# Patient Record
Sex: FEMALE | Race: WHITE | NOT HISPANIC OR LATINO | Employment: OTHER | URBAN - METROPOLITAN AREA
[De-identification: names, ages, dates, MRNs, and addresses within clinical notes are randomized per-mention and may not be internally consistent; named-entity substitution may affect disease eponyms.]

---

## 2017-04-19 ENCOUNTER — HOSPITAL ENCOUNTER (OUTPATIENT)
Dept: MAMMOGRAPHY | Facility: HOSPITAL | Age: 62
Discharge: HOME | End: 2017-04-19
Attending: OBSTETRICS & GYNECOLOGY | Admitting: OBSTETRICS & GYNECOLOGY

## 2017-04-19 DIAGNOSIS — R92.8 ABNORMAL MAMMOGRAM: ICD-10-CM

## 2017-04-19 PROCEDURE — 77065 DX MAMMO INCL CAD UNI: CPT | Performed by: RADIOLOGY

## 2017-04-19 PROCEDURE — G0206 DX MAMMO INCL CAD UNI: HCPCS

## 2017-09-25 ENCOUNTER — TRANSCRIBE ORDERS (OUTPATIENT)
Dept: GYNECOLOGIC ONCOLOGY | Facility: CLINIC | Age: 62
End: 2017-09-25

## 2017-09-25 DIAGNOSIS — Z12.31 VISIT FOR SCREENING MAMMOGRAM: Primary | ICD-10-CM

## 2017-10-12 ENCOUNTER — APPOINTMENT (OUTPATIENT)
Dept: MAMMOGRAPHY | Facility: HOSPITAL | Age: 62
End: 2017-10-12

## 2017-10-12 ENCOUNTER — TRANSCRIBE ORDERS (OUTPATIENT)
Dept: GYNECOLOGIC ONCOLOGY | Facility: CLINIC | Age: 62
End: 2017-10-12

## 2017-10-12 DIAGNOSIS — R92.8 ABNORMAL MAMMOGRAPHY: Primary | ICD-10-CM

## 2017-10-16 ENCOUNTER — HOSPITAL ENCOUNTER (OUTPATIENT)
Dept: MAMMOGRAPHY | Facility: HOSPITAL | Age: 62
Discharge: HOME | End: 2017-10-16
Admitting: NURSE PRACTITIONER

## 2017-10-16 ENCOUNTER — HOSPITAL ENCOUNTER (OUTPATIENT)
Dept: ULTRASOUND IMAGING | Facility: HOSPITAL | Age: 62
Discharge: HOME | End: 2017-10-16

## 2017-10-16 DIAGNOSIS — R92.8 ABNORMAL MAMMOGRAPHY: ICD-10-CM

## 2017-10-16 PROCEDURE — 76642 ULTRASOUND BREAST LIMITED: CPT

## 2017-10-16 PROCEDURE — 76642 ULTRASOUND BREAST LIMITED: CPT | Performed by: RADIOLOGY

## 2017-10-16 PROCEDURE — 77062 BREAST TOMOSYNTHESIS BI: CPT | Performed by: RADIOLOGY

## 2017-10-16 PROCEDURE — G0279 TOMOSYNTHESIS, MAMMO: HCPCS

## 2017-10-16 PROCEDURE — 77066 DX MAMMO INCL CAD BI: CPT | Performed by: RADIOLOGY

## 2017-10-16 PROCEDURE — G0204 DX MAMMO INCL CAD BI: HCPCS

## 2017-10-25 ENCOUNTER — OFFICE VISIT (OUTPATIENT)
Dept: OBSTETRICS AND GYNECOLOGY | Facility: CLINIC | Age: 62
End: 2017-10-25

## 2017-10-25 VITALS
OXYGEN SATURATION: 97 % | HEIGHT: 62 IN | WEIGHT: 141.4 LBS | HEART RATE: 59 BPM | BODY MASS INDEX: 26.02 KG/M2 | DIASTOLIC BLOOD PRESSURE: 70 MMHG | SYSTOLIC BLOOD PRESSURE: 116 MMHG | RESPIRATION RATE: 14 BRPM

## 2017-10-25 DIAGNOSIS — Z01.419 WELL WOMAN EXAM WITH ROUTINE GYNECOLOGICAL EXAM: Primary | ICD-10-CM

## 2017-10-25 PROCEDURE — 99396 PREV VISIT EST AGE 40-64: CPT | Performed by: NURSE PRACTITIONER

## 2017-10-25 NOTE — PROGRESS NOTES
[facility] ANNUAL WELL WOMAN VISIT      Ziyad Naranjo  [id number]  1955      Chief Complaint: Gynecologic Exam (No complaints)        History of present illness:    Ziyad Naranjo is a 62 y.o. year old  menopausal female presenting to be seen for her annual exam. She is a previous patient of Dr. Kennedy, last seen on 10/25/2016 for annual exam. This past year she has not been on hormone replacement therapy.  There has not been vaginal bleeding in the last 12 months.  Menopausal symptoms are not present.    SEXUAL Hx:  She is not currently sexually active.  In the past year there has not been new sexual partners.    Condoms are not typically used.  She would not like to be screened for STD's at today's exam.  HEALTH Hx:  She exercises regularly: yes.  She wears her seat belt:yes.  She has concerns about domestic violence: no.  She has noticed changes in height: no.   She is a non-smoker.    Past Medical History:   Diagnosis Date   • Arthritis        Past Surgical History:   Procedure Laterality Date   • LAPAROSCOPIC TUBAL LIGATION     • REPLACEMENT TOTAL KNEE Left    • REPLACEMENT TOTAL KNEE Right     5 months after the left knee   • TOTAL HIP ARTHROPLASTY Right    • TOTAL HIP ARTHROPLASTY Left 2017   • TOTAL SHOULDER ARTHROPLASTY Left        MEDICATIONS: The current medication list was reviewed and reconciled.     Allergies:  has No Known Allergies.    Family History   Problem Relation Age of Onset   • Osteoporosis Mother    • Hypertension Father    • Breast cancer Neg Hx    • Ovarian cancer Neg Hx    • Endometrial cancer Neg Hx        Health Maintenance:  Last mammogram was 10/16/2017. Last colonoscopy was , with recommended follow-up in 10 year(s). Last DEXA was . Last pap smear was 10/25/2016, results were  normal PAP.. She  does not have a history of abnormal pap smears.    Review of Systems   Constitutional: Negative for fatigue, fever and unexpected weight change.  "  HENT: Negative for congestion, ear pain, hearing loss, sinus pressure and trouble swallowing.    Eyes: Negative for visual disturbance.   Respiratory: Negative for cough, chest tightness, shortness of breath and wheezing.    Cardiovascular: Negative for chest pain, palpitations and leg swelling.   Gastrointestinal: Negative for abdominal distention, abdominal pain, constipation, diarrhea, nausea and vomiting.   Endocrine: Negative for cold intolerance, heat intolerance, polydipsia, polyphagia and polyuria.   Genitourinary: Negative for difficulty urinating, dyspareunia, dysuria, frequency, hematuria, pelvic pain, urgency, vaginal bleeding, vaginal discharge and vaginal pain.   Musculoskeletal: Negative for arthralgias, gait problem, joint swelling and myalgias.   Skin: Negative for color change, pallor and rash.   Neurological: Negative for dizziness, seizures, syncope, weakness, light-headedness, numbness and headaches.   Hematological: Negative for adenopathy. Does not bruise/bleed easily.   Psychiatric/Behavioral: Negative for agitation, confusion, sleep disturbance and suicidal ideas. The patient is not nervous/anxious.        Physical Exam  Vital Signs: /70  Pulse 59  Resp 14  Ht 62\" (157.5 cm)  Wt 141 lb 6.4 oz (64.1 kg)  LMP  (LMP Unknown)  SpO2 97%  Breastfeeding? No  BMI 25.86 kg/m2   General Appearance:  alert, cooperative, no apparent distress and appears stated age   Neurologic/Psychiatric: A&O x 3, gait steady, appropriate affect   HEENT:  Normocephalic, without obvious abnormality, mucous membranes moist   Neck: Supple, symmetrical, trachea midline, no adenopathy;  No thyromegaly, masses, or tenderness   Back:   Symmetric, no curvature, ROM normal, no CVA tenderness   Lungs:   Clear to auscultation bilaterally; respirations regular, even, and unlabored bilaterally   Heart:  Regular rate and rhythm, no murmurs appreciated   Breasts:  Symmetrical, no masses, no lesions and no nipple " discharge   Abdomen:   Soft, non-tender, non-distended and no organomegaly   Lymph nodes: No cervical, supraclavicular, inguinal or axillary adenopathy noted   Extremities: Normal, atraumatic; no clubbing, cyanosis, or edema    Skin: No rashes, ulcers, or suspicious lesions noted   Pelvic: External Genitalia  without lesions or skin changes  Vagina  is pink, moist, without lesions.   Cervix  normal, without lesions, no discharge, no CMT and pap obtained  Uterus  normal size, midposition, mobile and nontender  Ovaries  without palpable masses or fullness  Parametria  smooth  Rectovaginal  Female rectovaginal: deferred       Procedure Note:  No notes on file    Assessment and Plan:    Ziyad was seen today for gynecologic exam.    Diagnoses and all orders for this visit:    Well woman exam with routine gynecological exam  -     Pap IG, HPV-hr - ThinPrep Vial, Cervix; Future        Pap was done today per patient request even though it has been less then 3 years since her last Pap smear.  If she does not receive the results of the Pap within 2 weeks  time, she was instructed to call to find out the results.  I explained to Ziyad that the recommendations for Pap smear interval in a low risk patient's has lengthened to 3 years time.  I encouraged her to be seen yearly for a full physical exam including breast and pelvic exam even during the off years when PAP's will not be performed.    She was encouraged to get yearly mammograms.  She should report any palpable breast lump(s) or skin changes regardless of mammographic findings.  I explained to Ziyad that notification regarding her mammogram results will come from the center performing the study.  Our office will not be routinely calling with mammogram results.  It is her responsibility to make sure that the results from the mammogram are communicated to her by the breast center.  If she has any questions about the results, she is welcome to call our office  anytime.    Repeat colonoscopy in 2024 or sooner if problems. Repeat DEXA at age 67 unless problems.     Return in about 1 year (around 10/25/2018) for annual exam or PRN.      KEHINDE Stone      Note: Speech recognition transcription software was used to dictate portions of this document.  An attempt at proofreading has been made though minor errors in transcription may still be present.  Please do not hesitate to call our office with any questions.

## 2017-11-21 ENCOUNTER — APPOINTMENT (OUTPATIENT)
Dept: MAMMOGRAPHY | Facility: HOSPITAL | Age: 62
End: 2017-11-21

## 2018-09-12 ENCOUNTER — TRANSCRIBE ORDERS (OUTPATIENT)
Dept: GYNECOLOGIC ONCOLOGY | Facility: CLINIC | Age: 63
End: 2018-09-12

## 2018-09-12 DIAGNOSIS — Z12.31 VISIT FOR SCREENING MAMMOGRAM: Primary | ICD-10-CM

## 2018-10-19 ENCOUNTER — HOSPITAL ENCOUNTER (OUTPATIENT)
Dept: MAMMOGRAPHY | Facility: HOSPITAL | Age: 63
Discharge: HOME | End: 2018-10-19
Admitting: NURSE PRACTITIONER

## 2018-10-19 DIAGNOSIS — Z12.31 VISIT FOR SCREENING MAMMOGRAM: ICD-10-CM

## 2018-10-19 PROCEDURE — 77063 BREAST TOMOSYNTHESIS BI: CPT

## 2018-10-19 PROCEDURE — 77063 BREAST TOMOSYNTHESIS BI: CPT | Performed by: RADIOLOGY

## 2018-10-19 PROCEDURE — 77067 SCR MAMMO BI INCL CAD: CPT

## 2018-10-19 PROCEDURE — 77067 SCR MAMMO BI INCL CAD: CPT | Performed by: RADIOLOGY

## 2018-10-29 PROBLEM — Z01.419 WELL WOMAN EXAM: Status: ACTIVE | Noted: 2018-10-29

## 2018-10-30 ENCOUNTER — OFFICE VISIT (OUTPATIENT)
Dept: OBSTETRICS AND GYNECOLOGY | Facility: CLINIC | Age: 63
End: 2018-10-30

## 2018-10-30 VITALS
DIASTOLIC BLOOD PRESSURE: 82 MMHG | HEIGHT: 62 IN | BODY MASS INDEX: 26.79 KG/M2 | WEIGHT: 145.6 LBS | SYSTOLIC BLOOD PRESSURE: 128 MMHG

## 2018-10-30 DIAGNOSIS — Z01.419 WELL WOMAN EXAM: Primary | ICD-10-CM

## 2018-10-30 DIAGNOSIS — N95.1 SYMPTOMATIC MENOPAUSAL OR FEMALE CLIMACTERIC STATES: ICD-10-CM

## 2018-10-30 PROCEDURE — 99396 PREV VISIT EST AGE 40-64: CPT | Performed by: OBSTETRICS & GYNECOLOGY

## 2018-10-30 NOTE — PROGRESS NOTES
"Subjective   Chief Complaint   Patient presents with   • Gynecologic Exam     pt here for annual, last pap 10/25/17 negative, mammo 10/19/18 negative. pt states no c/o     Ziyad Naranjo is a 63 y.o. year old  menopausal female presenting to be seen for her annual exam.  This past year she has not been on hormone replacement therapy.  There has not been vaginal bleeding in the last 12 months.  Menopausal symptoms are present (vaginal dryness) but not affecting her quality of life .    SEXUAL Hx:  She is currently sexually active.  In the past year there she has not been sexually active.    Condoms are not needed because she is not sexually active.  She would not like to be screened for STD's at today's exam.  [place] is painful: not asked  HEALTH Hx:  She exercises regularly: yes.  She wears her seat belt: yes.  She has concerns about domestic violence: no.  She has noticed changes in height: no.  OTHER THINGS SHE WANTS TO DISCUSS TODAY:  Nothing else    The following portions of the patient's history were reviewed and updated as appropriate:problem list, current medications, allergies, past family history, past medical history, past social history and past surgical history.    Smoking status: Current Every Day Smoker                                                   Packs/day: 1.00      Years: 0.00      Smokeless tobacco: Never Used                          Review of Systems  Constitutional POS: nothing reported    NEG: anorexia or night sweats   Genitourinary POS: nothing reported    NEG: dysuria or hematuria      Gastointestinal POS: nothing reported    NEG: bloating, change in bowel habits, melena or reflux symptoms   Integument POS: nothing reported    NEG: moles that are changing in size, shape, color or rashes   Breast POS: nothing reported    NEG: persistent breast lump, skin dimpling or nipple discharge        Objective   /82   Ht 157.5 cm (62\")   Wt 66 kg (145 lb 9.6 oz)   LMP  (LMP " Unknown)   BMI 26.63 kg/m²     General:  well developed; well nourished  no acute distress   Skin:  No suspicious lesions seen   Thyroid: normal to inspection and palpation   Breasts:  Examined in supine position  Symmetric without masses or skin dimpling  Nipples normal without inversion, lesions or discharge  There are no palpable axillary nodes   Abdomen: soft, non-tender; no masses  no umbilical or inginual hernias are present  no hepato-splenomegaly   Pelvis: Clinical staff was present for exam  External genitalia:  normal appearance of the external genitalia including Bartholin's and [place]'s glands.  :  urethral meatus normal;  Vaginal:  atrophic mucosal changes are present;  Cervix:  normal appearance. atrophic  Uterus:  normal size, shape and consistency.  Adnexa:  normal bimanual exam of the adnexa.  Rectal:  digital rectal exam not performed; anus visually normal appearing.        Assessment   1. Normal GYN exam in menopause  2. She is up to date on all relevant gynecologic and colorectal screenings except colonoscopy     Plan   1. Pap was not done today.  I explained to Ziyad that the recommendations for Pap smear interval in a low risk patient has lengthened to 3 years time.  I told Ziyad she still needs to be seen in our office yearly for a full physical including breast and pelvic exam.  2. She was encouraged to get yearly mammograms.  She should report any palpable breast lump(s) or skin changes regardless of mammographic findings.  I explained to Ziyad that notification regarding her mammogram results will come from the center performing the study.  Our office will not be routinely calling with mammogram results.  It is her responsibility to make sure that the results from the mammogram are communicated to her by the breast center.  If she has any questions about the results, she is welcome to call our office anytime.  3. DEXA 2016 T score -1.6 with normal FRAX. Recommend repeating  this year or next year given post menopausal and smoker   4. Colonoscopy was recommended for screening for colon cancer.  The procedure was briefly discussed and its benefits for early detection of colon cancer were emphasized.  I explained to Ziyad that we could help her to schedule it if she wishes.  Additionally, she could also contact her primary care physician to help make this arrangement.  After considering these options she will contact her PCP to arrange colonoscopy. The following data needs to be obtained to update her medical records: last colonoscopy.  5. The importance of keeping all planned follow-up and taking all medications as prescribed was emphasized.  6. Follow up for annual exam in 1 year     No orders of the defined types were placed in this encounter.         This note was electronically signed.    Tiff Mata MD  October 30, 2018    Note: Speech recognition transcription software may have been used to create portions of this document.  An attempt at proofreading has been made but errors in transcription could still be present.

## 2019-09-11 ENCOUNTER — TRANSCRIBE ORDERS (OUTPATIENT)
Dept: ADMINISTRATIVE | Facility: HOSPITAL | Age: 64
End: 2019-09-11

## 2019-09-11 DIAGNOSIS — Z12.31 VISIT FOR SCREENING MAMMOGRAM: Primary | ICD-10-CM

## 2019-11-06 ENCOUNTER — OFFICE VISIT (OUTPATIENT)
Dept: OBSTETRICS AND GYNECOLOGY | Facility: CLINIC | Age: 64
End: 2019-11-06

## 2019-11-06 VITALS
DIASTOLIC BLOOD PRESSURE: 74 MMHG | HEIGHT: 63 IN | SYSTOLIC BLOOD PRESSURE: 120 MMHG | BODY MASS INDEX: 26.22 KG/M2 | WEIGHT: 148 LBS

## 2019-11-06 DIAGNOSIS — Z12.11 COLON CANCER SCREENING: ICD-10-CM

## 2019-11-06 DIAGNOSIS — Z01.419 WELL WOMAN EXAM: Primary | ICD-10-CM

## 2019-11-06 PROCEDURE — 99396 PREV VISIT EST AGE 40-64: CPT | Performed by: OBSTETRICS & GYNECOLOGY

## 2019-11-06 NOTE — PROGRESS NOTES
"Subjective   Chief Complaint   Patient presents with   • Gynecologic Exam     pt here for annual. last pap 10/25/17 negative, last mammo 10/19/18 negative and has one scheduled for 19. pt has a place that she is concerned with on her arm.     Ziyad Naranjo is a 64 y.o. year old  menopausal female presenting to be seen for her annual exam.  This past year she has not been on hormone replacement therapy.  There has not been vaginal bleeding in the last 12 months.  Menopausal symptoms are not present.    SEXUAL Hx:  She is not currently sexually active -single.  In the past year there she has not been sexually active.    Condoms are not needed because she is not sexually active.  She would not like to be screened for STD's at today's exam.  [place] is painful: not asked  HEALTH Hx:  She exercises regularly: yes, gardens and mowing   She wears her seat belt: yes.  She has concerns about domestic violence: no.  OTHER THINGS SHE WANTS TO DISCUSS TODAY:  Nothing else    The following portions of the patient's history were reviewed and updated as appropriate:problem list, current medications, allergies, past family history, past medical history, past social history and past surgical history.    Social History    Tobacco Use      Smoking status: Current Every Day Smoker        Packs/day: 1.00      Smokeless tobacco: Never Used      Review of Systems  Constitutional POS: nothing reported    NEG: anorexia or night sweats   Genitourinary POS: nothing reported    NEG: dysuria or hematuria      Gastointestinal POS: nothing reported    NEG: bloating, change in bowel habits, melena or reflux symptoms   Integument POS: nothing reported    NEG: moles that are changing in size, shape, color or rashes   Breast POS: nothing reported    NEG: persistent breast lump, skin dimpling or nipple discharge        Objective   /74   Ht 160 cm (63\")   Wt 67.1 kg (148 lb)   LMP  (LMP Unknown)   Breastfeeding? No   BMI " 26.22 kg/m²     General:  well developed; well nourished  no acute distress   Skin:  No suspicious lesions seen   Thyroid: normal to inspection and palpation   Breasts:  Examined in supine position  Symmetric without masses or skin dimpling  Nipples normal without inversion, lesions or discharge  There are no palpable axillary nodes   Abdomen: soft, non-tender; no masses  no umbilical or inguinal hernias are present  no hepato-splenomegaly   Pelvis: Clinical staff was present for exam  External genitalia:  normal appearance of the external genitalia including Bartholin's and [place]'s glands.  :  urethral meatus normal;  Vaginal:  normal pink mucosa without prolapse or lesions.  Cervix:  normal appearance.  Uterus:  normal size, shape and consistency.  Adnexa:  non palpable bilaterally.  Rectal:  anus visually normal appearing. recto-vaginal exam unremarkable and confirms findings;        Assessment   1. Normal GYN exam in menopause  2. She is up to date on all relevant gynecologic and colorectal screenings except DEXA's for osteoporosis     Plan   1. Pap was not done today.  I explained to Ziyad that the Pap smears are no longer recommended in patient's after 65 years of age.   I stressed to Ziyad that she still should be seen to be seen yearly for a full physical including breast and pelvic exam.-  2. Colonoscopy was recommended for screening for colon cancer.  The procedure was briefly discussed and its benefits for early detection of colon cancer were emphasized.  I explained to Ziyad that we could help her to schedule it if she wishes.  Additionally, she could also contact her primary care physician to help make this arrangement.  After considering these options she wants help setting up her colonoscopy.  Referral will be made to Dr. Sol for outpatient colonoscopy.  3. She was encouraged to get yearly mammograms.  She should report any palpable breast lump(s) or skin changes regardless of  mammographic findings.  I explained to Ziyad that notification regarding her mammogram results will come from the center performing the study.  Our office will not be routinely calling with mammogram results.  It is her responsibility to make sure that the results from the mammogram are communicated to her by the breast center.  If she has any questions about the results, she is welcome to call our office anytime.  4. Encouraged her to get DEXA that was ordered last year.   5. The importance of keeping all planned follow-up and taking all medications as prescribed was emphasized.  6. Follow up for annual exam in one year    No orders of the defined types were placed in this encounter.         This note was electronically signed.    Tiff Mata MD  November 6, 2019    Note: Speech recognition transcription software may have been used to create portions of this document.  An attempt at proofreading has been made but errors in transcription could still be present.

## 2019-11-12 ENCOUNTER — APPOINTMENT (OUTPATIENT)
Dept: MAMMOGRAPHY | Facility: HOSPITAL | Age: 64
End: 2019-11-12

## 2019-11-12 ENCOUNTER — HOSPITAL ENCOUNTER (OUTPATIENT)
Dept: MAMMOGRAPHY | Facility: HOSPITAL | Age: 64
Discharge: HOME | End: 2019-11-12
Admitting: OBSTETRICS & GYNECOLOGY

## 2019-11-12 DIAGNOSIS — Z12.31 VISIT FOR SCREENING MAMMOGRAM: ICD-10-CM

## 2019-11-12 PROCEDURE — 77067 SCR MAMMO BI INCL CAD: CPT | Performed by: RADIOLOGY

## 2019-11-12 PROCEDURE — 77063 BREAST TOMOSYNTHESIS BI: CPT

## 2019-11-12 PROCEDURE — 77063 BREAST TOMOSYNTHESIS BI: CPT | Performed by: RADIOLOGY

## 2019-11-12 PROCEDURE — 77067 SCR MAMMO BI INCL CAD: CPT

## 2020-10-20 ENCOUNTER — TRANSCRIBE ORDERS (OUTPATIENT)
Dept: ADMINISTRATIVE | Facility: HOSPITAL | Age: 65
End: 2020-10-20

## 2020-10-20 DIAGNOSIS — Z12.31 VISIT FOR SCREENING MAMMOGRAM: Primary | ICD-10-CM

## 2020-11-18 ENCOUNTER — OFFICE VISIT (OUTPATIENT)
Dept: OBSTETRICS AND GYNECOLOGY | Facility: CLINIC | Age: 65
End: 2020-11-18

## 2020-11-18 ENCOUNTER — LAB (OUTPATIENT)
Dept: LAB | Facility: HOSPITAL | Age: 65
End: 2020-11-18

## 2020-11-18 VITALS
DIASTOLIC BLOOD PRESSURE: 80 MMHG | HEIGHT: 63 IN | BODY MASS INDEX: 27.14 KG/M2 | WEIGHT: 153.2 LBS | SYSTOLIC BLOOD PRESSURE: 128 MMHG

## 2020-11-18 DIAGNOSIS — Z01.419 WELL WOMAN EXAM WITH ROUTINE GYNECOLOGICAL EXAM: Primary | ICD-10-CM

## 2020-11-18 DIAGNOSIS — N95.1 SYMPTOMATIC MENOPAUSAL OR FEMALE CLIMACTERIC STATES: ICD-10-CM

## 2020-11-18 DIAGNOSIS — Z86.39 HISTORY OF VITAMIN D DEFICIENCY: ICD-10-CM

## 2020-11-18 LAB — 25(OH)D3 SERPL-MCNC: 30.5 NG/ML (ref 30–100)

## 2020-11-18 PROCEDURE — 82306 VITAMIN D 25 HYDROXY: CPT

## 2020-11-18 PROCEDURE — 36415 COLL VENOUS BLD VENIPUNCTURE: CPT

## 2020-11-18 PROCEDURE — 99396 PREV VISIT EST AGE 40-64: CPT | Performed by: OBSTETRICS & GYNECOLOGY

## 2020-11-18 NOTE — PROGRESS NOTES
"Subjective   Chief Complaint   Patient presents with   • Gynecologic Exam     annual. last pap 10/25/17 negative, last mammo 19 negative. mammo scheduled for 21.     Ziyad Naranjo is a 65 y.o. year old  menopausal female presenting to be seen for her annual exam.  This past year she has not been on hormone replacement therapy.  There has not been vaginal bleeding in the last 12 months.  Menopausal symptoms are not present.    SEXUAL Hx:  She is not currently sexually active.  In the past year there she has not been sexually active.    Condoms are not needed because she is not sexually active.  She would not like to be screened for STD's at today's exam.  [place] is painful: n/a  HEALTH Hx:  She exercises regularly: yes.  She wears her seat belt: yes.  She has concerns about domestic violence: no.  OTHER THINGS SHE WANTS TO DISCUSS TODAY:  Nothing else    The following portions of the patient's history were reviewed and updated as appropriate:problem list, current medications, allergies, past family history, past medical history, past social history and past surgical history.    Social History    Tobacco Use      Smoking status: Current Every Day Smoker        Packs/day: 1.00      Smokeless tobacco: Never Used      Review of Systems  Constitutional POS: nothing reported    NEG: anorexia or night sweats   Genitourinary POS: nothing reported    NEG: dysuria or hematuria      Gastointestinal POS: nothing reported    NEG: bloating, change in bowel habits, melena or reflux symptoms   Integument POS: nothing reported    NEG: moles that are changing in size, shape, color or rashes   Breast POS: nothing reported    NEG: persistent breast lump, skin dimpling or nipple discharge        Objective   /80   Ht 160 cm (63\")   Wt 69.5 kg (153 lb 3.2 oz)   LMP  (LMP Unknown)   Breastfeeding No   BMI 27.14 kg/m²     General:  well developed; well nourished  no acute distress   Skin:  No suspicious " lesions seen   Thyroid: normal to inspection and palpation   Breasts:  Examined in supine position  Symmetric without masses or skin dimpling  Nipples normal without inversion, lesions or discharge  There are no palpable axillary nodes   Abdomen: soft, non-tender; no masses  no umbilical or inguinal hernias are present  no hepato-splenomegaly   Pelvis: Clinical staff was present for exam  External genitalia:  normal appearance of the external genitalia including Bartholin's and [place]'s glands.  :  urethral meatus normal;  Vaginal:  normal pink mucosa without prolapse or lesions.  Cervix:  normal appearance.  Uterus:  normal size, shape and consistency.  Adnexa:  normal bimanual exam of the adnexa.  Rectal:  digital rectal exam not performed; anus visually normal appearing.        Assessment   1. Normal GYN exam in menopause  2. She is up to date on all relevant gynecologic and colorectal screenings except colon cancer screening and DEXA's for osteoporosis     Plan   Pap was not done today.  I explained to Ziyad that the recommendations for Pap smear interval in a low risk patient has lengthened to 5 years time if history of normal pap co-test.  I told Ziyad she still needs to be seen in our office yearly for a full physical including breast and pelvic exam.  She was encouraged to get yearly mammograms.  She should report any palpable breast lump(s) or skin changes regardless of mammographic findings.  I explained to Ziyad that notification regarding her mammogram results will come from the center performing the study.  Our office will not be routinely calling with mammogram results.  It is her responsibility to make sure that the results from the mammogram are communicated to her by the breast center.  If she has any questions about the results, she is welcome to call our office anytime.  She will call regarding colonoscopy   DEXA ordered   The following tests were ordered today: vitamin D 25-OH.  It  was explained to Ziyad that all lab test should be back within the one week after they are performed. She will be notified about the results, regardless of the findings. If she has not been contacted by the office within 2 weeks after the test has been performed, it is her responsibility to contact us to learn about her results.  The importance of keeping all planned follow-up and taking all medications as prescribed was emphasized.  Follow up for annual exam in one year    No orders of the defined types were placed in this encounter.         This note was electronically signed.    Tiff Mata MD  November 18, 2020    Note: Speech recognition transcription software may have been used to create portions of this document.  An attempt at proofreading has been made but errors in transcription could still be present.

## 2020-11-19 ENCOUNTER — TELEPHONE (OUTPATIENT)
Dept: OBSTETRICS AND GYNECOLOGY | Facility: CLINIC | Age: 65
End: 2020-11-19

## 2020-11-19 NOTE — TELEPHONE ENCOUNTER
BRYCE    Patient called and was returning a missed call.  Please call back and advise.  Thank you.

## 2021-01-21 ENCOUNTER — HOSPITAL ENCOUNTER (OUTPATIENT)
Dept: MAMMOGRAPHY | Facility: HOSPITAL | Age: 66
Discharge: HOME | End: 2021-01-21
Admitting: OBSTETRICS & GYNECOLOGY

## 2021-01-21 DIAGNOSIS — Z12.31 VISIT FOR SCREENING MAMMOGRAM: ICD-10-CM

## 2021-01-21 PROCEDURE — 77067 SCR MAMMO BI INCL CAD: CPT

## 2021-01-21 PROCEDURE — 77067 SCR MAMMO BI INCL CAD: CPT | Performed by: RADIOLOGY

## 2021-01-21 PROCEDURE — 77063 BREAST TOMOSYNTHESIS BI: CPT | Performed by: RADIOLOGY

## 2021-01-21 PROCEDURE — 77063 BREAST TOMOSYNTHESIS BI: CPT

## 2021-12-20 ENCOUNTER — TRANSCRIBE ORDERS (OUTPATIENT)
Dept: ADMINISTRATIVE | Facility: HOSPITAL | Age: 66
End: 2021-12-20

## 2021-12-20 ENCOUNTER — OFFICE VISIT (OUTPATIENT)
Dept: OBSTETRICS AND GYNECOLOGY | Facility: CLINIC | Age: 66
End: 2021-12-20

## 2021-12-20 VITALS
WEIGHT: 150.2 LBS | DIASTOLIC BLOOD PRESSURE: 76 MMHG | BODY MASS INDEX: 26.61 KG/M2 | SYSTOLIC BLOOD PRESSURE: 128 MMHG | HEIGHT: 63 IN

## 2021-12-20 DIAGNOSIS — Z12.31 VISIT FOR SCREENING MAMMOGRAM: Primary | ICD-10-CM

## 2021-12-20 DIAGNOSIS — Z01.411 ENCOUNTER FOR GYNECOLOGICAL EXAMINATION WITH ABNORMAL FINDING: Primary | ICD-10-CM

## 2021-12-20 DIAGNOSIS — F17.210 CIGARETTE SMOKER: ICD-10-CM

## 2021-12-20 DIAGNOSIS — Z13.820 SCREENING FOR OSTEOPOROSIS: ICD-10-CM

## 2021-12-20 PROCEDURE — 99396 PREV VISIT EST AGE 40-64: CPT | Performed by: NURSE PRACTITIONER

## 2021-12-20 NOTE — PROGRESS NOTES
"Chief Complaint  Ziyad Naranjo is a 66 y.o.  female presenting for Annual Exam    History of Present Illness  Very pleasant 67yo woman, pt of Dr. Mata's, here for annual gyn exam.  She is doing well in the past yr since appt with Dr. Mata.  Denies any illnesses or hospitalizations.  She is current daily smoker (< ppd).  Last mammo 2021 nl.  Last colonoscopy was last wk (Tues), FU recommendation is unknown.  She will let me know.  (Had polyps & diverticular dz)  Last pap nl / HPV neg in 10/2017. Last DEXA > 3 yrs ago, osteopenia (?)       The following portions of the patient's history were reviewed and updated as appropriate: allergies, current medications, past family history, past medical history, past social history, past surgical history and problem list.    No Known Allergies      Current Outpatient Medications:   •  Acetaminophen (TYLENOL PO), Take  by mouth., Disp: , Rfl:   •  Psyllium (METAMUCIL FIBER PO), Take  by mouth., Disp: , Rfl:     Past Medical History:   Diagnosis Date   • Arthritis    • Menopause         Past Surgical History:   Procedure Laterality Date   • LAPAROSCOPIC TUBAL LIGATION     • REPLACEMENT TOTAL KNEE Left    • REPLACEMENT TOTAL KNEE Right     5 months after the left knee   • TOTAL HIP ARTHROPLASTY Right    • TOTAL HIP ARTHROPLASTY Left 2017   • TOTAL SHOULDER ARTHROPLASTY Left    • WISDOM TOOTH EXTRACTION         Objective  /76   Ht 160 cm (63\")   Wt 68.1 kg (150 lb 3.2 oz)   LMP  (LMP Unknown)   Breastfeeding No   BMI 26.61 kg/m²     Physical Exam  Exam conducted with a chaperone present.   Constitutional:       Appearance: Normal appearance.   HENT:      Head: Normocephalic.   Neck:      Thyroid: No thyroid mass or thyromegaly.   Cardiovascular:      Rate and Rhythm: Normal rate and regular rhythm.      Heart sounds: Normal heart sounds.   Pulmonary:      Effort: Pulmonary effort is normal.      Breath sounds: Normal breath sounds. " "  Chest:   Breasts:      Right: No inverted nipple, mass, nipple discharge, axillary adenopathy or supraclavicular adenopathy.      Left: No inverted nipple, mass, nipple discharge, axillary adenopathy or supraclavicular adenopathy.       Abdominal:      Palpations: Abdomen is soft. There is no mass.      Tenderness: There is no abdominal tenderness.      Hernia: There is no hernia in the left inguinal area or right inguinal area.   Genitourinary:     General: Normal vulva.      Labia:         Right: No rash, tenderness or lesion.         Left: No rash, tenderness or lesion.       Vagina: Normal. No erythema.      Cervix: No discharge, lesion or erythema.      Uterus: Not enlarged and not tender.       Adnexa:         Right: No mass or tenderness.          Left: No mass or tenderness.        Comments: Anus appears wnl.  No rectal exam performed.  Lymphadenopathy:      Upper Body:      Right upper body: No supraclavicular or axillary adenopathy.      Left upper body: No supraclavicular or axillary adenopathy.   Neurological:      Mental Status: She is alert.         Assessment/Plan   Diagnoses and all orders for this visit:    1. Encounter for gynecological examination with abnormal finding (Primary)    2. Cigarette smoker    3. Screening for osteoporosis  -     DEXA Bone Density Axial; Future    Couns re: SBE, mammo (to call right away for mammo appt) and other pvt health procedures.  We also discussed the smoking cessation tech, herb cruz, \"[employer]\".  Enc'd in her efforts to try to quit smoker.    Procedures        Return in about 1 year (around 12/20/2022) for Annual physical.    Greta Davis, KEHINDE  12/20/2021  "

## 2022-02-16 ENCOUNTER — OFFICE VISIT (OUTPATIENT)
Dept: FAMILY MEDICINE CLINIC | Facility: CLINIC | Age: 67
End: 2022-02-16

## 2022-02-16 ENCOUNTER — LAB (OUTPATIENT)
Dept: LAB | Facility: HOSPITAL | Age: 67
End: 2022-02-16

## 2022-02-16 VITALS
HEART RATE: 74 BPM | WEIGHT: 155.6 LBS | OXYGEN SATURATION: 99 % | BODY MASS INDEX: 27.57 KG/M2 | DIASTOLIC BLOOD PRESSURE: 76 MMHG | HEIGHT: 63 IN | SYSTOLIC BLOOD PRESSURE: 120 MMHG

## 2022-02-16 DIAGNOSIS — Z13.1 SCREENING FOR DIABETES MELLITUS: ICD-10-CM

## 2022-02-16 DIAGNOSIS — R73.09 OTHER ABNORMAL GLUCOSE: ICD-10-CM

## 2022-02-16 DIAGNOSIS — F17.200 CURRENT SMOKER: ICD-10-CM

## 2022-02-16 DIAGNOSIS — R63.5 WEIGHT GAIN: Primary | ICD-10-CM

## 2022-02-16 DIAGNOSIS — Z13.220 SCREENING FOR CHOLESTEROL LEVEL: ICD-10-CM

## 2022-02-16 DIAGNOSIS — R63.5 WEIGHT GAIN: ICD-10-CM

## 2022-02-16 DIAGNOSIS — Z00.00 ENCOUNTER FOR MEDICAL EXAMINATION TO ESTABLISH CARE: ICD-10-CM

## 2022-02-16 LAB
CHOLEST SERPL-MCNC: 167 MG/DL (ref 0–200)
DEPRECATED RDW RBC AUTO: 38.9 FL (ref 37–54)
ERYTHROCYTE [DISTWIDTH] IN BLOOD BY AUTOMATED COUNT: 11.6 % (ref 12.3–15.4)
HBA1C MFR BLD: 5.4 % (ref 4.8–5.6)
HCT VFR BLD AUTO: 42.1 % (ref 34–46.6)
HDLC SERPL QL: 1.99
HDLC SERPL-MCNC: 84 MG/DL (ref 40–60)
HGB BLD-MCNC: 14.4 G/DL (ref 12–15.9)
LDLC SERPL CALC-MCNC: 72 MG/DL (ref 0–100)
MCH RBC QN AUTO: 31.6 PG (ref 26.6–33)
MCHC RBC AUTO-ENTMCNC: 34.2 G/DL (ref 31.5–35.7)
MCV RBC AUTO: 92.5 FL (ref 79–97)
PLATELET # BLD AUTO: 258 10*3/MM3 (ref 140–450)
PMV BLD AUTO: 9.9 FL (ref 6–12)
RBC # BLD AUTO: 4.55 10*6/MM3 (ref 3.77–5.28)
TRIGL SERPL-MCNC: 52 MG/DL (ref 0–150)
VLDLC SERPL-MCNC: 11 MG/DL (ref 5–40)
WBC NRBC COR # BLD: 8.11 10*3/MM3 (ref 3.4–10.8)

## 2022-02-16 PROCEDURE — 84443 ASSAY THYROID STIM HORMONE: CPT

## 2022-02-16 PROCEDURE — 83036 HEMOGLOBIN GLYCOSYLATED A1C: CPT

## 2022-02-16 PROCEDURE — 80053 COMPREHEN METABOLIC PANEL: CPT

## 2022-02-16 PROCEDURE — 99204 OFFICE O/P NEW MOD 45 MIN: CPT | Performed by: STUDENT IN AN ORGANIZED HEALTH CARE EDUCATION/TRAINING PROGRAM

## 2022-02-16 PROCEDURE — 80061 LIPID PANEL: CPT

## 2022-02-16 PROCEDURE — 36415 COLL VENOUS BLD VENIPUNCTURE: CPT

## 2022-02-16 PROCEDURE — 85027 COMPLETE CBC AUTOMATED: CPT

## 2022-02-16 NOTE — PROGRESS NOTES
New Patient Office Visit      Patient Name: Ziyad Naranjo  : 1955   MRN: [id number]   Care Team: Patient Care Team:  Maggy Kang DO as PCP - General (Internal Medicine)    Chief Complaint:    Chief Complaint   Patient presents with   • Establish Care     thyroid and blood sugar check    • Weight Gain     no appetite        History of Present Illness: Ziyad Naranjo is a 66 y.o. female who is here today to establish care. She is overall feeling well. Wants to discuss weight gain and establish care.    Weight gain - has been trying to lose weight over the past year, has tried several different diets and calorie deficits. Has gained 10 lbs in the past 6 months   Stays active around the house, enjoys walking and yard work.     Chronic knee and hip pain - s/p bilateral knee replacement in , left shoulder replacement in , right hip replacement , left hip repleacement in     Has not had menstrual cycle in 20+ years - denies break through bleeding or GYN complaints    Subjective      Review of Systems:   Review of Systems - See HPI    Past Medical History:   Past Medical History:   Diagnosis Date   • Arthritis    • Menopause        Past Surgical History:   Past Surgical History:   Procedure Laterality Date   • LAPAROSCOPIC TUBAL LIGATION     • REPLACEMENT TOTAL KNEE Left    • REPLACEMENT TOTAL KNEE Right     5 months after the left knee   • TOTAL HIP ARTHROPLASTY Right    • TOTAL HIP ARTHROPLASTY Left 2017   • TOTAL SHOULDER ARTHROPLASTY Left    • WISDOM TOOTH EXTRACTION         Family History:   Family History   Problem Relation Age of Onset   • Osteoporosis Mother    • Hypertension Father    • Breast cancer Neg Hx    • Ovarian cancer Neg Hx    • Endometrial cancer Neg Hx    • Uterine cancer Neg Hx    • Colon cancer Neg Hx        Social History:   Social History     Socioeconomic History   • Marital status: Single   Tobacco Use   • Smoking status: Current Every Day Smoker  "    Packs/day: 1.00     Types: Cigarettes   • Smokeless tobacco: Never Used   Substance and Sexual Activity   • Alcohol use: Yes     Comment: occ   • Drug use: No   • Sexual activity: Not Currently     Birth control/protection: Surgical, Post-menopausal     Comment: BTL       Tobacco History:   Social History     Tobacco Use   Smoking Status Current Every Day Smoker   • Packs/day: 1.00   • Types: Cigarettes   Smokeless Tobacco Never Used       Medications:     Current Outpatient Medications:   •  Acetaminophen (TYLENOL PO), Take  by mouth As Needed., Disp: , Rfl:   •  Psyllium (METAMUCIL FIBER PO), Take  by mouth Every Other Day., Disp: , Rfl:     Allergies:   No Known Allergies    Objective     Physical Exam:  Vital Signs:   Vitals:    02/16/22 0928   BP: 120/76   Pulse: 74   SpO2: 99%   Weight: 70.6 kg (155 lb 9.6 oz)   Height: 160 cm (63\")     Body mass index is 27.56 kg/m².     Physical Exam  Vitals reviewed.   Constitutional:       Appearance: Normal appearance.   Cardiovascular:      Rate and Rhythm: Normal rate and regular rhythm.      Pulses: Normal pulses.      Heart sounds: Normal heart sounds.   Pulmonary:      Effort: Pulmonary effort is normal. No respiratory distress.      Breath sounds: Normal breath sounds. No wheezing.   Musculoskeletal:      Comments: Decreased mobility in right shoulder with abduction and flexion >90 degrees    Skin:     General: Skin is warm and dry.   Neurological:      Mental Status: She is alert.   Psychiatric:         Mood and Affect: Mood normal.         Behavior: Behavior normal.         Judgment: Judgment normal.         Assessment / Plan      Assessment/Plan:   Problems Addressed This Visit  Diagnoses and all orders for this visit:    1. Weight gain (Primary)  -     Comprehensive metabolic panel; Future  -     CBC No Differential; Future  -     TSH Rfx On Abnormal To Free T4; Future  Will screen for underlying metabolic disorder, in the interim encouraged her to continue " focusing on calorie deficit, goal exercise 150min/week. We discussed possibility of nutrition referral and/or adding medication to aide weight loss if lab work is unrevealing.    2. Encounter for medical examination to establish care    3. Screening for cholesterol level  -     Lipid Panel w/ Chol/HDL Ratio; Future    4. Screening for diabetes mellitus  -     Hemoglobin A1c; Future    5. Other abnormal glucose   -     Hemoglobin A1c; Future    6. Current smoker  Discuss cessation and LDCT for lung cancer screening at next visit         Plan of care reviewed with patient at the conclusion of today's visit. Education was provided regarding diagnosis and management.  Patient verbalizes understanding of and agreement with management plan.      Follow Up:   Return in about 3 months (around 5/16/2022) for Annual.          DO CHARLES Bunch RD  [facility]  [address]  [facility] [address]  Fax [phone]  Phone [phone]

## 2022-02-17 LAB
ALBUMIN SERPL-MCNC: 4.7 G/DL (ref 3.5–5.2)
ALBUMIN/GLOB SERPL: 1.8 G/DL
ALP SERPL-CCNC: 56 U/L (ref 39–117)
ALT SERPL W P-5'-P-CCNC: 15 U/L (ref 1–33)
ANION GAP SERPL CALCULATED.3IONS-SCNC: 8.9 MMOL/L (ref 5–15)
AST SERPL-CCNC: 20 U/L (ref 1–32)
BILIRUB SERPL-MCNC: 0.4 MG/DL (ref 0–1.2)
BUN SERPL-MCNC: 9 MG/DL (ref 8–23)
BUN/CREAT SERPL: 14.8 (ref 7–25)
CALCIUM SPEC-SCNC: 9.9 MG/DL (ref 8.6–10.5)
CHLORIDE SERPL-SCNC: 99 MMOL/L (ref 98–107)
CO2 SERPL-SCNC: 27.1 MMOL/L (ref 22–29)
CREAT SERPL-MCNC: 0.61 MG/DL (ref 0.57–1)
GFR SERPL CREATININE-BSD FRML MDRD: 99 ML/MIN/1.73
GLOBULIN UR ELPH-MCNC: 2.6 GM/DL
GLUCOSE SERPL-MCNC: 77 MG/DL (ref 65–99)
POTASSIUM SERPL-SCNC: 4.5 MMOL/L (ref 3.5–5.2)
PROT SERPL-MCNC: 7.3 G/DL (ref 6–8.5)
SODIUM SERPL-SCNC: 135 MMOL/L (ref 136–145)
TSH SERPL DL<=0.05 MIU/L-ACNC: 0.57 UIU/ML (ref 0.27–4.2)

## 2022-02-25 ENCOUNTER — HOSPITAL ENCOUNTER (OUTPATIENT)
Dept: MAMMOGRAPHY | Facility: HOSPITAL | Age: 67
Discharge: HOME | End: 2022-02-25
Admitting: OBSTETRICS & GYNECOLOGY

## 2022-02-25 DIAGNOSIS — Z12.31 VISIT FOR SCREENING MAMMOGRAM: ICD-10-CM

## 2022-02-25 PROCEDURE — 77063 BREAST TOMOSYNTHESIS BI: CPT

## 2022-02-25 PROCEDURE — 77067 SCR MAMMO BI INCL CAD: CPT | Performed by: RADIOLOGY

## 2022-02-25 PROCEDURE — 77063 BREAST TOMOSYNTHESIS BI: CPT | Performed by: RADIOLOGY

## 2022-02-25 PROCEDURE — 77067 SCR MAMMO BI INCL CAD: CPT

## 2022-03-09 DIAGNOSIS — R63.5 WEIGHT GAIN: Primary | ICD-10-CM

## 2022-03-09 DIAGNOSIS — E66.3 OVERWEIGHT (BMI 25.0-29.9): ICD-10-CM

## 2022-12-21 ENCOUNTER — OFFICE VISIT (OUTPATIENT)
Dept: OBSTETRICS AND GYNECOLOGY | Facility: CLINIC | Age: 67
End: 2022-12-21

## 2022-12-21 VITALS
SYSTOLIC BLOOD PRESSURE: 160 MMHG | HEIGHT: 63 IN | BODY MASS INDEX: 26.19 KG/M2 | DIASTOLIC BLOOD PRESSURE: 90 MMHG | WEIGHT: 147.8 LBS

## 2022-12-21 DIAGNOSIS — N95.2 ATROPHY OF VAGINA: ICD-10-CM

## 2022-12-21 DIAGNOSIS — Z78.0 POSTMENOPAUSAL: ICD-10-CM

## 2022-12-21 DIAGNOSIS — Z01.411 ENCOUNTER FOR GYNECOLOGICAL EXAMINATION WITH ABNORMAL FINDING: Primary | ICD-10-CM

## 2022-12-21 DIAGNOSIS — M85.80 OSTEOPENIA, UNSPECIFIED LOCATION: ICD-10-CM

## 2022-12-21 DIAGNOSIS — Z13.820 SCREENING FOR OSTEOPOROSIS: ICD-10-CM

## 2022-12-21 PROCEDURE — 99397 PER PM REEVAL EST PAT 65+ YR: CPT | Performed by: NURSE PRACTITIONER

## 2022-12-21 NOTE — PROGRESS NOTES
"Chief Complaint  Ziayd Naranjo is a 67 y.o.  female presenting for Gynecologic Exam    History of Present Illness  Mary is a pleasant 66yo woman, , here for annual gyn exam.  She has no gyn c/o's.  She is postmenopausal, and has had no vag bldg.  She is an every day smoker (1ppd).  We discussed smoking cessation.  She notes vaginal dryness.  (Willing to accept treatment with local vag medication.)  Due for DEXA scan.  Pap will be done today.  Other preventive health procedures are up to date.    The following portions of the patient's history were reviewed and updated as appropriate: allergies, current medications, past family history, past medical history, past social history, past surgical history and problem list.    No Known Allergies      Current Outpatient Medications:   •  Acetaminophen (TYLENOL PO), Take  by mouth As Needed., Disp: , Rfl:   •  Psyllium (METAMUCIL FIBER PO), Take  by mouth Every Other Day., Disp: , Rfl:     Past Medical History:   Diagnosis Date   • Arthritis    • Menopause         Past Surgical History:   Procedure Laterality Date   • LAPAROSCOPIC TUBAL LIGATION     • REPLACEMENT TOTAL KNEE Left    • REPLACEMENT TOTAL KNEE Right     5 months after the left knee   • TOTAL HIP ARTHROPLASTY Right    • TOTAL HIP ARTHROPLASTY Left 2017   • TOTAL SHOULDER ARTHROPLASTY Left    • WISDOM TOOTH EXTRACTION         Objective  /90   Ht 160 cm (63\")   Wt 67 kg (147 lb 12.8 oz)   LMP  (LMP Unknown)   Breastfeeding No   BMI 26.18 kg/m²     Physical Exam  Vitals and nursing note reviewed. Exam conducted with a chaperone present.   Constitutional:       General: She is not in acute distress.     Appearance: Normal appearance. She is not ill-appearing.   HENT:      Head: Normocephalic.   Neck:      Thyroid: No thyroid mass or thyromegaly.   Cardiovascular:      Rate and Rhythm: Normal rate and regular rhythm.      Heart sounds: Normal heart sounds. No murmur " heard.  Pulmonary:      Effort: Pulmonary effort is normal. No respiratory distress.      Breath sounds: Normal breath sounds.   Chest:   Breasts:     Right: No inverted nipple, mass or nipple discharge.      Left: No inverted nipple, mass or nipple discharge.   Abdominal:      Palpations: Abdomen is soft. There is no mass.      Tenderness: There is no abdominal tenderness.   Genitourinary:     General: Normal vulva.      Labia:         Right: No lesion.         Left: No lesion.       Vagina: Erythema present. No vaginal discharge.      Cervix: No discharge, lesion or erythema.      Uterus: Not enlarged and not tender.       Adnexa:         Right: No mass or tenderness.          Left: No mass or tenderness.        Comments: Marked atrophic erythema of the vaginal mucosa.  No abnormal discharge.  Pap was taken.  Anus appears wnl.  No rectal exam performed.  Lymphadenopathy:      Upper Body:      Right upper body: No supraclavicular or axillary adenopathy.      Left upper body: No supraclavicular or axillary adenopathy.   Skin:     General: Skin is warm and dry.   Neurological:      Mental Status: She is alert and oriented to person, place, and time.   Psychiatric:         Mood and Affect: Mood normal.         Behavior: Behavior normal.         Assessment/Plan   Diagnoses and all orders for this visit:    1. Encounter for gynecological examination with abnormal finding (Primary)    2. Atrophy of vagina    3. Postmenopausal  -     DEXA Bone Density Axial; Future    4. Screening for osteoporosis  -     DEXA Bone Density Axial; Future    5. Osteopenia, unspecified location  -     DEXA Bone Density Axial; Future    Compounded Estradiol Cream 0.1mg/gm  Si.5g intravag at HS 2x/ week    Procedures    40 to 64: Counseling/Anticipatory Guidance Discussed: misuse of tobacco, screenings and self-breast exam    Return in about 1 year (around 2023) for Annual physical.    Greta Davis, APRN  2022

## 2022-12-23 LAB — REF LAB TEST METHOD: NORMAL

## 2023-03-27 ENCOUNTER — TRANSCRIBE ORDERS (OUTPATIENT)
Dept: ADMINISTRATIVE | Facility: HOSPITAL | Age: 68
End: 2023-03-27
Payer: MEDICARE

## 2023-03-27 DIAGNOSIS — Z12.31 VISIT FOR SCREENING MAMMOGRAM: Primary | ICD-10-CM

## 2023-04-18 ENCOUNTER — HOSPITAL ENCOUNTER (OUTPATIENT)
Dept: MAMMOGRAPHY | Facility: HOSPITAL | Age: 68
Discharge: HOME | End: 2023-04-18
Admitting: NURSE PRACTITIONER
Payer: MEDICARE

## 2023-04-18 DIAGNOSIS — Z12.31 VISIT FOR SCREENING MAMMOGRAM: ICD-10-CM

## 2023-04-18 PROCEDURE — 77063 BREAST TOMOSYNTHESIS BI: CPT

## 2023-04-18 PROCEDURE — 77067 SCR MAMMO BI INCL CAD: CPT

## 2023-04-19 DIAGNOSIS — G43.009 MIGRAINE WITHOUT AURA AND WITHOUT STATUS MIGRAINOSUS, NOT INTRACTABLE: Primary | ICD-10-CM

## 2023-04-19 RX ORDER — SUMATRIPTAN 100 MG/1
TABLET, FILM COATED ORAL
Qty: 8 TABLET | Refills: 5 | Status: SHIPPED | OUTPATIENT
Start: 2023-04-19

## 2023-12-22 ENCOUNTER — OFFICE VISIT (OUTPATIENT)
Dept: OBSTETRICS AND GYNECOLOGY | Facility: CLINIC | Age: 68
End: 2023-12-22
Payer: MEDICARE

## 2023-12-22 VITALS
DIASTOLIC BLOOD PRESSURE: 80 MMHG | WEIGHT: 143 LBS | HEIGHT: 63 IN | BODY MASS INDEX: 25.34 KG/M2 | SYSTOLIC BLOOD PRESSURE: 156 MMHG

## 2023-12-22 DIAGNOSIS — Z01.419 ENCOUNTER FOR GYNECOLOGICAL EXAMINATION WITHOUT ABNORMAL FINDING: Primary | ICD-10-CM

## 2023-12-22 DIAGNOSIS — N95.2 ATROPHY OF VAGINA: ICD-10-CM

## 2023-12-22 PROCEDURE — 1160F RVW MEDS BY RX/DR IN RCRD: CPT | Performed by: NURSE PRACTITIONER

## 2023-12-22 PROCEDURE — 1159F MED LIST DOCD IN RCRD: CPT | Performed by: NURSE PRACTITIONER

## 2023-12-22 PROCEDURE — G0101 CA SCREEN;PELVIC/BREAST EXAM: HCPCS | Performed by: NURSE PRACTITIONER

## 2023-12-22 NOTE — PROGRESS NOTES
"Chief Complaint  Ziyad Naranjo is a 68 y.o.  female presenting for Gynecologic Exam and Med Refill (Patient will call for refills on compounded estradiol vaginal cream.)    History of Present Illness  Mary is a 67yo woman, , here for annual gyn exam.  Her past surgical history includes, in part, bilateral tubal sterilization.  She uses compounded estradiol cream vaginally, and it relieves her symptoms of atrophy.  She is postmenopausal.  Never any postmenopausal bleeding.  She smokes 1 ppd.  Otherwise, ROS neg    She declines DEXA scans.  She is intentional re: calcium & vitamin D intake, and does lots of walking    Last pap 2022, neg  Last mammogram 2023, neg  Last colonoscopy 2021, repeat in 3-5 years, per pt      The following portions of the patient's history were reviewed and updated as appropriate: allergies, current medications, past family history, past medical history, past social history, past surgical history, and problem list.    No Known Allergies      Current Outpatient Medications:     Acetaminophen (TYLENOL PO), Take  by mouth As Needed., Disp: , Rfl:     Psyllium (METAMUCIL FIBER PO), Take  by mouth Every Other Day., Disp: , Rfl:     SUMAtriptan (IMITREX) 100 MG tablet, Take one tablet at onset of headache. May repeat dose one time in 2 hours if headache not relieved., Disp: 8 tablet, Rfl: 5    Unable to find, 1 each 1 (One) Time. Compounded estradiol vaginal cream 0.1 mg/gm 0.5 mg by vaginal route 2 X week, Disp: , Rfl:     Past Medical History:   Diagnosis Date    Arthritis     Menopause         Past Surgical History:   Procedure Laterality Date    LAPAROSCOPIC TUBAL LIGATION      REPLACEMENT TOTAL KNEE Left 2009    REPLACEMENT TOTAL KNEE Right     5 months after the left knee    TOTAL HIP ARTHROPLASTY Right 2012    TOTAL HIP ARTHROPLASTY Left 2017    TOTAL SHOULDER ARTHROPLASTY Left     WISDOM TOOTH EXTRACTION         Objective  /80   Ht 160 cm (63\")   Wt " 64.9 kg (143 lb)   LMP  (LMP Unknown)   Breastfeeding No   BMI 25.33 kg/m²     Physical Exam  Vitals and nursing note reviewed. Exam conducted with a chaperone present.   Constitutional:       General: She is not in acute distress.     Appearance: Normal appearance. She is not ill-appearing.   HENT:      Head: Normocephalic.   Neck:      Thyroid: No thyroid mass or thyromegaly.   Cardiovascular:      Rate and Rhythm: Normal rate and regular rhythm.      Heart sounds: Normal heart sounds. No murmur heard.  Pulmonary:      Effort: Pulmonary effort is normal. No respiratory distress.      Breath sounds: Normal breath sounds.   Chest:   Breasts:     Right: No inverted nipple, mass or nipple discharge.      Left: No inverted nipple, mass or nipple discharge.   Abdominal:      Palpations: Abdomen is soft. There is no mass.      Tenderness: There is no abdominal tenderness.   Genitourinary:     General: Normal vulva.      Labia:         Right: No rash, tenderness or lesion.         Left: No rash, tenderness or lesion.       Vagina: Normal. No erythema.      Cervix: No discharge, lesion or erythema.      Uterus: Not enlarged and not tender.       Adnexa:         Right: No mass or tenderness.          Left: No mass or tenderness.        Comments: Anus appears wnl.  No rectal exam performed.  Lymphadenopathy:      Upper Body:      Right upper body: No supraclavicular or axillary adenopathy.      Left upper body: No supraclavicular or axillary adenopathy.   Skin:     General: Skin is warm and dry.   Neurological:      Mental Status: She is alert and oriented to person, place, and time.   Psychiatric:         Mood and Affect: Mood normal.         Behavior: Behavior normal.         Assessment/Plan   Diagnoses and all orders for this visit:    1. Encounter for gynecological examination without abnormal finding (Primary)    2. Atrophy of vagina    She will call when she needs a refill of the Compounded Estradiol  Cream.      Procedures    40 to 64: Counseling/Anticipatory Guidance Discussed: nutrition, physical activity, misuse of tobacco, screenings, and self-breast exam    Return in about 1 year (around 12/22/2024) for Annual physical.    Greta Davis, APRN  12/22/2023

## 2024-01-02 ENCOUNTER — LAB (OUTPATIENT)
Dept: LAB | Facility: HOSPITAL | Age: 69
End: 2024-01-02
Payer: MEDICARE

## 2024-01-02 ENCOUNTER — OFFICE VISIT (OUTPATIENT)
Dept: FAMILY MEDICINE CLINIC | Facility: CLINIC | Age: 69
End: 2024-01-02
Payer: MEDICARE

## 2024-01-02 VITALS
DIASTOLIC BLOOD PRESSURE: 82 MMHG | SYSTOLIC BLOOD PRESSURE: 120 MMHG | WEIGHT: 148 LBS | HEART RATE: 70 BPM | OXYGEN SATURATION: 96 % | HEIGHT: 63 IN | BODY MASS INDEX: 26.22 KG/M2

## 2024-01-02 DIAGNOSIS — Z13.220 SCREENING FOR CHOLESTEROL LEVEL: ICD-10-CM

## 2024-01-02 DIAGNOSIS — M21.611 BUNION OF GREAT TOE OF RIGHT FOOT: ICD-10-CM

## 2024-01-02 DIAGNOSIS — Z71.6 ENCOUNTER FOR SMOKING CESSATION COUNSELING: ICD-10-CM

## 2024-01-02 DIAGNOSIS — Z01.818 PREOP EXAMINATION: Primary | ICD-10-CM

## 2024-01-02 DIAGNOSIS — F17.200 CURRENT SMOKER: ICD-10-CM

## 2024-01-02 DIAGNOSIS — R00.1 SINUS BRADYCARDIA: ICD-10-CM

## 2024-01-02 DIAGNOSIS — E66.3 OVERWEIGHT (BMI 25.0-29.9): ICD-10-CM

## 2024-01-02 LAB
ALBUMIN SERPL-MCNC: 4.4 G/DL (ref 3.5–5.2)
ALBUMIN/GLOB SERPL: 1.6 G/DL
ALP SERPL-CCNC: 54 U/L (ref 39–117)
ALT SERPL W P-5'-P-CCNC: 14 U/L (ref 1–33)
ANION GAP SERPL CALCULATED.3IONS-SCNC: 11.6 MMOL/L (ref 5–15)
AST SERPL-CCNC: 15 U/L (ref 1–32)
BILIRUB SERPL-MCNC: 0.3 MG/DL (ref 0–1.2)
BUN SERPL-MCNC: 15 MG/DL (ref 8–23)
BUN/CREAT SERPL: 23.4 (ref 7–25)
CALCIUM SPEC-SCNC: 9.3 MG/DL (ref 8.6–10.5)
CHLORIDE SERPL-SCNC: 101 MMOL/L (ref 98–107)
CHOLEST SERPL-MCNC: 199 MG/DL (ref 0–200)
CO2 SERPL-SCNC: 24.4 MMOL/L (ref 22–29)
CREAT SERPL-MCNC: 0.64 MG/DL (ref 0.57–1)
DEPRECATED RDW RBC AUTO: 38.8 FL (ref 37–54)
EGFRCR SERPLBLD CKD-EPI 2021: 97.6 ML/MIN/1.73
ERYTHROCYTE [DISTWIDTH] IN BLOOD BY AUTOMATED COUNT: 11.7 % (ref 12.3–15.4)
GLOBULIN UR ELPH-MCNC: 2.7 GM/DL
GLUCOSE SERPL-MCNC: 79 MG/DL (ref 65–99)
HCT VFR BLD AUTO: 39.8 % (ref 34–46.6)
HDLC SERPL-MCNC: 93 MG/DL (ref 40–60)
HGB BLD-MCNC: 13.2 G/DL (ref 12–15.9)
LDLC SERPL CALC-MCNC: 98 MG/DL (ref 0–100)
LDLC/HDLC SERPL: 1.06 {RATIO}
MCH RBC QN AUTO: 30.1 PG (ref 26.6–33)
MCHC RBC AUTO-ENTMCNC: 33.2 G/DL (ref 31.5–35.7)
MCV RBC AUTO: 90.9 FL (ref 79–97)
PLATELET # BLD AUTO: 299 10*3/MM3 (ref 140–450)
PMV BLD AUTO: 9.6 FL (ref 6–12)
POTASSIUM SERPL-SCNC: 4.6 MMOL/L (ref 3.5–5.2)
PROT SERPL-MCNC: 7.1 G/DL (ref 6–8.5)
RBC # BLD AUTO: 4.38 10*6/MM3 (ref 3.77–5.28)
SODIUM SERPL-SCNC: 137 MMOL/L (ref 136–145)
TRIGL SERPL-MCNC: 39 MG/DL (ref 0–150)
VLDLC SERPL-MCNC: 8 MG/DL (ref 5–40)
WBC NRBC COR # BLD AUTO: 6.03 10*3/MM3 (ref 3.4–10.8)

## 2024-01-02 PROCEDURE — 80061 LIPID PANEL: CPT | Performed by: STUDENT IN AN ORGANIZED HEALTH CARE EDUCATION/TRAINING PROGRAM

## 2024-01-02 PROCEDURE — 85027 COMPLETE CBC AUTOMATED: CPT | Performed by: STUDENT IN AN ORGANIZED HEALTH CARE EDUCATION/TRAINING PROGRAM

## 2024-01-02 PROCEDURE — 80053 COMPREHEN METABOLIC PANEL: CPT | Performed by: STUDENT IN AN ORGANIZED HEALTH CARE EDUCATION/TRAINING PROGRAM

## 2024-01-02 NOTE — PROGRESS NOTES
Established Office Visit      Patient Name: Ziyad Naranjo  : 1955   MRN: [id number]   Care Team: Patient Care Team:  Maggy Henry DO as PCP - General (Internal Medicine)    Chief Complaint:    Chief Complaint   Patient presents with    Pre-op Exam       History of Present Illness: Ziyad Naranjo is a 68 y.o. female with tobacco use, chronic hip/knee pain who is here today to discuss preop evaluation     Planning to have bunion surgery later this month. Needs preoperative evaluation at the request of her surgeon. She is feeling well. No recent illness. No new meds. No history of problems with sedation. She is trying to quit smoking. Has not had cigarette in 1 week, planning to be nicotine free for 1 month by time of surgery. She smokes 1/4 pack every now and then. Sometimes able to go several days without cigarette.    Has smoked for >30 years, history of >20 pack years    Has history of bradycardia but has never had this worked up. Asymptomatic. No syncope, palpitations, chest pain, SOA, fatigue, dizziness. Has no hx of CAD.       Subjective      Review of Systems:   Review of Systems - See HPI    I have reviewed and the following portions of the patient's history were updated as appropriate: past family history, past medical history, past social history, past surgical history and problem list.    Medications:     Current Outpatient Medications:     Acetaminophen (TYLENOL PO), Take  by mouth As Needed., Disp: , Rfl:     Psyllium (METAMUCIL FIBER PO), Take  by mouth Every Other Day., Disp: , Rfl:     SUMAtriptan (IMITREX) 100 MG tablet, Take one tablet at onset of headache. May repeat dose one time in 2 hours if headache not relieved., Disp: 8 tablet, Rfl: 5    Unable to find, 1 each 1 (One) Time. Compounded estradiol vaginal cream 0.1 mg/gm 0.5 mg by vaginal route 2 X week, Disp: , Rfl:     Allergies:   No Known Allergies    Objective     Physical Exam:  Vital Signs:   Vitals:    24 0836  "  BP: 120/82   Pulse: 70   SpO2: 96%   Weight: 67.1 kg (148 lb)   Height: 160 cm (63\")     Body mass index is 26.22 kg/m².     Physical Exam  Vitals reviewed.   Constitutional:       Appearance: Normal appearance. She is not ill-appearing.   Cardiovascular:      Rate and Rhythm: Regular rhythm. Bradycardia present.      Pulses: Normal pulses.      Heart sounds: No murmur heard.     Comments: HR 50 at time of exam  Pulmonary:      Effort: Pulmonary effort is normal. No respiratory distress.   Skin:     General: Skin is warm and dry.   Neurological:      Mental Status: She is alert.   Psychiatric:         Mood and Affect: Mood normal.         Behavior: Behavior normal.         Judgment: Judgment normal.           ECG 12 Lead    Date/Time: 1/2/2024 9:37 AM  Performed by: Maggy Henry DO    Authorized by: Maggy Henry DO  Previous ECG: no previous ECG available  Rhythm: sinus bradycardia  Rate: bradycardic  QRS axis: normal    Clinical impression: abnormal EKG            Assessment / Plan      Assessment/Plan:   Problems Addressed This Visit  Diagnoses and all orders for this visit:    1. Preop examination (Primary)  -     CBC (No Diff); Future  -     Lipid Panel; Future  -     Comprehensive Metabolic Panel; Future  -     Ambulatory Referral to [facility] [place] [facility]  -     ECG 12 Lead    RCRI score - 0   METs >4  EKG today with sinus bradycardia (rate 48, no ischemic changes).   She does have plans to go under sedation with bunion surgery and I have encouraged her to discuss with cardiology prior to proceeding with this. Her asymptomatic bradycardia may be completely benign but would benefit from second opinion.  Tobacco use - counseled on importance of cessation. She has been cigarette free x 1 week and planning to be free x 1 month prior to surgery. Goal is complete cessation after surgery as well. She is agreeable. Discussed triggers/habit forming behavior and ways to break this.   She " is not obese, no hx of ROEL, not taking any antiplatelets or AC, no history of alcoholism, no personal/family history of sedation complications.    Other than asymptomatic sinus bradycardia, patient is considered low risk to proceed with elective surgery. I suggest cardiac consultation prior to procedure and she is agreeable to this. Referred to heart and valve.       2. Overweight (BMI 25.0-29.9)  -     CBC (No Diff); Future  -     Lipid Panel; Future  -     Comprehensive Metabolic Panel; Future  -     CBC (No Diff)  -     Lipid Panel  -     Comprehensive Metabolic Panel    5. Screening for cholesterol level  -     Lipid Panel; Future  -     Lipid Panel    6. Bunion of great toe of right foot    7. Sinus bradycardia  -     Ambulatory Referral to [facility] [place] [facility]  -     ECG 12 Lead    8. Current smoker  9. Encounter for smoking cessation counseling  As above        Plan of care reviewed with patient at the conclusion of today's visit. Education was provided regarding diagnosis and management.  Patient verbalizes understanding of and agreement with management plan.    Follow Up:   No follow-ups on file.        DO CHARLES Sibley RD  [facility]  [address]  [facility] [address]  Fax [phone]  Phone [phone]

## 2024-01-08 NOTE — PROGRESS NOTES
"[facility]  Heart and Valve Center    Chief Complaint  Slow Heart Rate and Surgical Clearance    Subjective    History of Present Illness {CC  Problem List  Visit  Diagnosis   Encounters  Notes  Medications  Labs  Result Review Imaging  Media :23}     Ziyad Naranjo is a 68 y.o. female with no cardiac history who presents today for evaluation of bradycardia at the request of Dr. Henry.     Patient is planning on having bunion surgery 1/18 with Dr. Ricky Ennis at [facility] and preoperative clearance was requested. She thinks it is conscious sedation.  She had an EKG at her primary care office which showed sinus bradycardia with a heart rate of 48. She reports that she is active [place] her [place] without exertional symptoms. No prior cardiac history.  She denies chest pain, shortness of breath, palpitations, dizziness, lightheadedness or syncope    Reports her parents had \"floppy valves\" but lived in their 90s, both had AF and father had PM later in life     Cardiac risks: age, former tobacco abuse    Objective     Vital Signs:   Vitals:    01/09/24 1515 01/09/24 1516 01/09/24 1517   BP: 121/66 116/71 115/63   BP Location: Right arm Left arm Left arm   Patient Position: Sitting Standing Sitting   Cuff Size: Adult Adult Adult   Pulse: 57 60 56   Resp:   18   Temp: 97 °F (36.1 °C) 97 °F (36.1 °C) 97 °F (36.1 °C)   TempSrc:   Temporal   SpO2: 96%  96%   Weight:   67.1 kg (148 lb)   Height:   160 cm (63\")     Body mass index is 26.22 kg/m².  Physical Exam  Vitals reviewed.   Constitutional:       Appearance: Normal appearance.   HENT:      Head: Normocephalic.   Neck:      Vascular: No carotid bruit.   Cardiovascular:      Rate and Rhythm: Regular rhythm. Bradycardia present.      Pulses: Normal pulses.      Heart sounds: Normal heart sounds, S1 normal and S2 normal. No murmur heard.  Pulmonary:      Effort: Pulmonary effort is normal. No respiratory distress.      Breath sounds: Normal breath " sounds.   Chest:      Chest wall: No tenderness.   Abdominal:      General: Abdomen is flat.      Palpations: Abdomen is soft.   Musculoskeletal:      Cervical back: Neck supple.      Right lower leg: No edema.      Left lower leg: No edema.   Skin:     General: Skin is warm and dry.   Neurological:      General: No focal deficit present.      Mental Status: She is alert and oriented to person, place, and time. Mental status is at baseline.   Psychiatric:         Mood and Affect: Mood normal.         Behavior: Behavior normal.         Thought Content: Thought content normal.              Result Review  Data Reviewed:{ Labs  Result Review  Imaging  Med Tab  Media :23}   ECG 12 Lead (01/02/2024 09:37)   Lipid Panel (01/02/2024 09:41)  Comprehensive Metabolic Panel (01/02/2024 09:41)  CBC (No Diff) (01/02/2024 09:41)         Assessment and Plan {CC Problem List  Visit Diagnosis  ROS  Review (Popup)  Health Maintenance  Quality  BestPractice  Medications  SmartSets  SnapShot Encounters  Media :23}   1. Preop cardiovascular exam  -Revised Cardiac Risk Index (RCRI) is 0. Patient is able to perform >/= 4 METS of physical activity. From a cardiac standpoint, appears to be at an acceptable risk for noncardiac surgery. No further cardiac testing necessary. Reviewed risks with patient and patient verbalized understanding    2. Bradycardia  - Patient is asymptomatic  - We discussed symptoms and reasons to call        Follow Up {Instructions Charge Capture  Follow-up Communications :23}   Return if symptoms worsen or fail to improve.    Patient was given instructions and counseling regarding her condition or for health maintenance advice. Please see specific information pulled into the AVS if appropriate.  Advised to call the Heart and Valve Center with any questions, concerns, or worsening symptoms.

## 2024-01-09 ENCOUNTER — OFFICE VISIT (OUTPATIENT)
Dept: CARDIOLOGY | Facility: HOSPITAL | Age: 69
End: 2024-01-09
Payer: MEDICARE

## 2024-01-09 VITALS
BODY MASS INDEX: 26.22 KG/M2 | TEMPERATURE: 97 F | SYSTOLIC BLOOD PRESSURE: 115 MMHG | OXYGEN SATURATION: 96 % | WEIGHT: 148 LBS | HEIGHT: 63 IN | HEART RATE: 56 BPM | DIASTOLIC BLOOD PRESSURE: 63 MMHG | RESPIRATION RATE: 18 BRPM

## 2024-01-09 DIAGNOSIS — R00.1 BRADYCARDIA: ICD-10-CM

## 2024-01-09 DIAGNOSIS — Z01.810 PREOP CARDIOVASCULAR EXAM: Primary | ICD-10-CM

## 2024-03-04 ENCOUNTER — TELEPHONE (OUTPATIENT)
Dept: FAMILY MEDICINE CLINIC | Facility: CLINIC | Age: 69
End: 2024-03-04
Payer: MEDICARE

## 2024-03-04 NOTE — TELEPHONE ENCOUNTER
"    Caller: Ziyad Naranjo \"Mary\"    Relationship: Self    Best call back number: [phone]     What test/procedure requested: PRIOR AUTHORIZATION IS NEEDED FOR LEFT FOOT SURGERY ON MARCH 21ST.     When is it needed: ASAP    Where is the test/procedure going to be performed: [facility]    Additional information or concerns: PATIENT IS NEEDING A PRIOR AUTHORIZATION IN ORDER FOR PATIENT TO HAVE FOOT SURGERY ON MARCH 21ST.           "

## 2024-03-12 ENCOUNTER — OFFICE VISIT (OUTPATIENT)
Dept: FAMILY MEDICINE CLINIC | Facility: CLINIC | Age: 69
End: 2024-03-12
Payer: MEDICARE

## 2024-03-12 ENCOUNTER — LAB (OUTPATIENT)
Dept: LAB | Facility: HOSPITAL | Age: 69
End: 2024-03-12
Payer: MEDICARE

## 2024-03-12 VITALS
SYSTOLIC BLOOD PRESSURE: 126 MMHG | OXYGEN SATURATION: 97 % | HEIGHT: 63 IN | DIASTOLIC BLOOD PRESSURE: 80 MMHG | HEART RATE: 64 BPM | WEIGHT: 147 LBS | BODY MASS INDEX: 26.05 KG/M2

## 2024-03-12 DIAGNOSIS — Z01.818 PREOP EXAMINATION: Primary | ICD-10-CM

## 2024-03-12 LAB
ALBUMIN SERPL-MCNC: 4.2 G/DL (ref 3.5–5.2)
ALBUMIN/GLOB SERPL: 1.5 G/DL
ALP SERPL-CCNC: 57 U/L (ref 39–117)
ALT SERPL W P-5'-P-CCNC: 7 U/L (ref 1–33)
ANION GAP SERPL CALCULATED.3IONS-SCNC: 11.7 MMOL/L (ref 5–15)
AST SERPL-CCNC: 18 U/L (ref 1–32)
BILIRUB SERPL-MCNC: 0.4 MG/DL (ref 0–1.2)
BUN SERPL-MCNC: 11 MG/DL (ref 8–23)
BUN/CREAT SERPL: 17.2 (ref 7–25)
CALCIUM SPEC-SCNC: 9.3 MG/DL (ref 8.6–10.5)
CHLORIDE SERPL-SCNC: 99 MMOL/L (ref 98–107)
CO2 SERPL-SCNC: 24.3 MMOL/L (ref 22–29)
CREAT SERPL-MCNC: 0.64 MG/DL (ref 0.57–1)
DEPRECATED RDW RBC AUTO: 39.9 FL (ref 37–54)
EGFRCR SERPLBLD CKD-EPI 2021: 97 ML/MIN/1.73
ERYTHROCYTE [DISTWIDTH] IN BLOOD BY AUTOMATED COUNT: 11.8 % (ref 12.3–15.4)
GLOBULIN UR ELPH-MCNC: 2.8 GM/DL
GLUCOSE SERPL-MCNC: 79 MG/DL (ref 65–99)
HCT VFR BLD AUTO: 40 % (ref 34–46.6)
HGB BLD-MCNC: 13.5 G/DL (ref 12–15.9)
MCH RBC QN AUTO: 31.1 PG (ref 26.6–33)
MCHC RBC AUTO-ENTMCNC: 33.8 G/DL (ref 31.5–35.7)
MCV RBC AUTO: 92.2 FL (ref 79–97)
PLATELET # BLD AUTO: 247 10*3/MM3 (ref 140–450)
PMV BLD AUTO: 9.5 FL (ref 6–12)
POTASSIUM SERPL-SCNC: 4.4 MMOL/L (ref 3.5–5.2)
PROT SERPL-MCNC: 7 G/DL (ref 6–8.5)
RBC # BLD AUTO: 4.34 10*6/MM3 (ref 3.77–5.28)
SODIUM SERPL-SCNC: 135 MMOL/L (ref 136–145)
WBC NRBC COR # BLD AUTO: 5.51 10*3/MM3 (ref 3.4–10.8)

## 2024-03-12 PROCEDURE — 99213 OFFICE O/P EST LOW 20 MIN: CPT | Performed by: STUDENT IN AN ORGANIZED HEALTH CARE EDUCATION/TRAINING PROGRAM

## 2024-03-12 PROCEDURE — 1159F MED LIST DOCD IN RCRD: CPT | Performed by: STUDENT IN AN ORGANIZED HEALTH CARE EDUCATION/TRAINING PROGRAM

## 2024-03-12 PROCEDURE — 80053 COMPREHEN METABOLIC PANEL: CPT | Performed by: STUDENT IN AN ORGANIZED HEALTH CARE EDUCATION/TRAINING PROGRAM

## 2024-03-12 PROCEDURE — 1160F RVW MEDS BY RX/DR IN RCRD: CPT | Performed by: STUDENT IN AN ORGANIZED HEALTH CARE EDUCATION/TRAINING PROGRAM

## 2024-03-12 PROCEDURE — 85027 COMPLETE CBC AUTOMATED: CPT | Performed by: STUDENT IN AN ORGANIZED HEALTH CARE EDUCATION/TRAINING PROGRAM

## 2024-03-12 NOTE — PROGRESS NOTES
"  Established Office Visit      Patient Name: Ziyad Naranjo  : 1955   MRN: [id number]   Care Team: Patient Care Team:  Maggy Henry DO as PCP - General (Internal Medicine)  ROMULO TRAN as Referring Physician (Podiatry)    Chief Complaint:    Chief Complaint   Patient presents with    Pre-op Exam     Foot surgery        History of Present Illness: Ziyad Naranjo is a 69 y.o. female who is here today to discuss preop evaluation.   She had right bunion surgery in 2024 and tolerated this well. Planning to have left bunion surgery next week. Former tobacco use but stopped prior to last surgery about 2-3 months ago.   She is feeling well. No recent illness. No new meds. No history of problems with sedation.     Subjective      Review of Systems:   Review of Systems - See HPI    I have reviewed and the following portions of the patient's history were updated as appropriate: past family history, past medical history, past social history, past surgical history and problem list.    Medications:     Current Outpatient Medications:     Acetaminophen (TYLENOL PO), Take  by mouth As Needed., Disp: , Rfl:     Psyllium (METAMUCIL FIBER PO), Take  by mouth Every Other Day., Disp: , Rfl:     SUMAtriptan (IMITREX) 100 MG tablet, Take one tablet at onset of headache. May repeat dose one time in 2 hours if headache not relieved., Disp: 8 tablet, Rfl: 5    Unable to find, 1 each 1 (One) Time. Compounded estradiol vaginal cream 0.1 mg/gm 0.5 mg by vaginal route 2 X week, Disp: , Rfl:     Allergies:   No Known Allergies    Objective     Physical Exam:  Vital Signs:   Vitals:    24 0758   BP: 126/80   Pulse: 64   SpO2: 97%   Weight: 66.7 kg (147 lb)   Height: 160 cm (63\")     Body mass index is 26.04 kg/m².     Physical Exam  Vitals reviewed.   Constitutional:       Appearance: Normal appearance. She is not ill-appearing.   Cardiovascular:      Rate and Rhythm: Normal rate and regular rhythm.      Pulses: Normal " pulses.      Heart sounds: Normal heart sounds. No murmur heard.  Pulmonary:      Effort: Pulmonary effort is normal. No respiratory distress.      Breath sounds: Normal breath sounds. No wheezing.   Skin:     General: Skin is warm and dry.   Neurological:      General: No focal deficit present.      Mental Status: She is alert.   Psychiatric:         Mood and Affect: Mood normal.         Behavior: Behavior normal.         Judgment: Judgment normal.         Assessment / Plan      Assessment/Plan:   Problems Addressed This Visit  Diagnoses and all orders for this visit:    1. Preop examination (Primary)  -     CBC No Differential; Future  -     Comprehensive metabolic panel; Future      RCRI score - 0   METs >4  EKG 1/2204 with sinus bradycardia (rate 48, no ischemic changes). Followed up with cardiology 1/9/2024 and ok to proceed with elective surgery as she is asymptomatic. No further workup necessary at this time.   Tobacco use - former smoker >20 years, quit 2-3 months ago  She is not obese, no hx of ROEL, not taking any antiplatelets or AC, no history of alcoholism, no personal/family history of sedation complications.    Patient is considered low risk to proceed with elective surgery. No further consultation recommended.     Will fax to Dr. Renan Ennis - [facility]        Plan of care reviewed with patient at the conclusion of today's visit. Education was provided regarding diagnosis and management.  Patient verbalizes understanding of and agreement with management plan.    Follow Up:   No follow-ups on file.        DO CHARLES Sibley RD  [facility]  [address] [facility] [address]  Fax [phone]  Phone [phone]

## 2024-03-12 NOTE — Clinical Note
Please fax office note and lab results (drawn today, will be resulted by tomorrow) to Dr. [facility]

## 2024-03-15 ENCOUNTER — TELEPHONE (OUTPATIENT)
Dept: FAMILY MEDICINE CLINIC | Facility: CLINIC | Age: 69
End: 2024-03-15
Payer: MEDICARE

## 2024-03-15 NOTE — TELEPHONE ENCOUNTER
Ana called from [facility] requesting the patient's preop office note from 03/12/24 and her Lab results. I faxed these to [phone] today.

## 2024-04-03 ENCOUNTER — TRANSCRIBE ORDERS (OUTPATIENT)
Dept: ADMINISTRATIVE | Facility: HOSPITAL | Age: 69
End: 2024-04-03
Payer: MEDICARE

## 2024-04-03 DIAGNOSIS — Z12.31 VISIT FOR SCREENING MAMMOGRAM: Primary | ICD-10-CM

## 2024-05-20 ENCOUNTER — TELEPHONE (OUTPATIENT)
Dept: OBSTETRICS AND GYNECOLOGY | Facility: CLINIC | Age: 69
End: 2024-05-20
Payer: MEDICARE

## 2024-05-20 NOTE — TELEPHONE ENCOUNTER
Caller:SVETLANA TORO     Relationship to patient: SELF    Best call back number: [phone]    Patient is needing: PT IS CALLING NEEDING HER PRESCRIPTION FOR HER Compounded Estradiol Cream SWITCHED OVER TO A NEW PHARMACY. PTS SCRIPT IS STILL GOOD AND IS NEEDING TO GET HER REFILL SENT TO   [facility] [address]  [phone] - [phone]  - [phone]    OKAY TO [facility] IF OFFICE NEEDS TO CONTACT HER.

## 2024-05-20 NOTE — TELEPHONE ENCOUNTER
At the patient's request I have called [facility] to transfer her prescription for compounded estradiol vaginal cream.  I have ordered the following per KEHINDE Mcmahon:    Compounded estradiol vaginal cream 0.1 mg/gm  30 gram tube with 2 refills  0.5 grams vaginally 2 X week.

## 2024-06-18 ENCOUNTER — HOSPITAL ENCOUNTER (OUTPATIENT)
Dept: MAMMOGRAPHY | Facility: HOSPITAL | Age: 69
Discharge: HOME | End: 2024-06-18
Admitting: NURSE PRACTITIONER
Payer: MEDICARE

## 2024-06-18 DIAGNOSIS — Z12.31 VISIT FOR SCREENING MAMMOGRAM: ICD-10-CM

## 2024-06-18 PROCEDURE — 77067 SCR MAMMO BI INCL CAD: CPT

## 2024-06-18 PROCEDURE — 77063 BREAST TOMOSYNTHESIS BI: CPT

## 2024-12-09 ENCOUNTER — OFFICE VISIT (OUTPATIENT)
Dept: FAMILY MEDICINE CLINIC | Facility: CLINIC | Age: 69
End: 2024-12-09
Payer: MEDICARE

## 2024-12-09 VITALS
BODY MASS INDEX: 26.15 KG/M2 | OXYGEN SATURATION: 99 % | WEIGHT: 147.6 LBS | SYSTOLIC BLOOD PRESSURE: 154 MMHG | HEIGHT: 63 IN | DIASTOLIC BLOOD PRESSURE: 82 MMHG | HEART RATE: 67 BPM

## 2024-12-09 DIAGNOSIS — L60.0 INGROWN NAIL OF GREAT TOE: Primary | ICD-10-CM

## 2024-12-09 PROCEDURE — 1125F AMNT PAIN NOTED PAIN PRSNT: CPT

## 2024-12-09 PROCEDURE — 99213 OFFICE O/P EST LOW 20 MIN: CPT

## 2024-12-09 NOTE — PROGRESS NOTES
Office Note     Name: Ziyad Naranjo    : 1955     MRN: [id number]     Chief Complaint  Ingrown Toe Nail    Subjective     History of Present Illness:  Zyiad Naranjo is a 69 y.o. female who presents today for 3 day history of L large toe pain.     She states that she has pain on the medial aspect of her left big toe. She worries about an ingrown toenail, and states she hasn't been able to properly visualize it. She tried soaking the toe in an epsom salt bath yesterday, but did not find that it helped symptoms very much. She states that she gets pedicures regularly, and states they have been trimming corners of toenails shorter and shorter    Review of Systems:   Review of Systems   Constitutional:  Negative for fatigue and fever.   Respiratory:  Negative for shortness of breath.    Cardiovascular:  Negative for chest pain and leg swelling.   Musculoskeletal:         Toe pain        Past Medical History:   Past Medical History:   Diagnosis Date    Arthritis     Menopause        Past Surgical History:   Past Surgical History:   Procedure Laterality Date    LAPAROSCOPIC TUBAL LIGATION      REPLACEMENT TOTAL KNEE Left 2009    REPLACEMENT TOTAL KNEE Right     5 months after the left knee    TOTAL HIP ARTHROPLASTY Right 2012    TOTAL HIP ARTHROPLASTY Left 2017    TOTAL SHOULDER ARTHROPLASTY Left 2010    WISDOM TOOTH EXTRACTION         Family History:   Family History   Problem Relation Age of Onset    Osteoporosis Mother     Hypertension Father     Breast cancer Neg Hx     Ovarian cancer Neg Hx     Endometrial cancer Neg Hx     Uterine cancer Neg Hx     Colon cancer Neg Hx        Social History:   Social History     Socioeconomic History    Marital status: Single   Tobacco Use    Smoking status: Former     Current packs/day: 0.00     Average packs/day: 0.7 packs/day for 14.0 years (10.3 ttl pk-yrs)     Types: Cigarettes     Start date:      Quit date: 2013     Years since quittin.9     Passive  "exposure: Never    Smokeless tobacco: Never   Vaping Use    Vaping status: Never Used   Substance and Sexual Activity    Alcohol use: Yes     Alcohol/week: 5.0 standard drinks of alcohol     Types: 3 Glasses of wine, 2 Shots of liquor per week     Comment: occ    Drug use: No    Sexual activity: Yes     Partners: Male     Birth control/protection: Surgical, Post-menopausal     Comment: BTL       Immunizations:   Immunization History   Administered Date(s) Administered    COVID-19 (PFIZER) Purple Cap Monovalent 01/22/2021, 02/19/2021        Medications:     Current Outpatient Medications:     Psyllium (METAMUCIL FIBER PO), Take  by mouth Every Other Day., Disp: , Rfl:     Unable to find, 1 each 1 (One) Time. Compounded estradiol vaginal cream 0.1 mg/gm 0.5 mg by vaginal route 2 X week, Disp: , Rfl:     Acetaminophen (TYLENOL PO), Take  by mouth As Needed. (Patient not taking: Reported on 12/9/2024), Disp: , Rfl:     SUMAtriptan (IMITREX) 100 MG tablet, Take one tablet at onset of headache. May repeat dose one time in 2 hours if headache not relieved. (Patient not taking: Reported on 12/9/2024), Disp: 8 tablet, Rfl: 5    Allergies:   No Known Allergies    Objective     Vital Signs  /82 (BP Location: Left arm, Patient Position: Sitting, Cuff Size: Adult)   Pulse 67   Ht 160 cm (62.99\")   Wt 67 kg (147 lb 9.6 oz)   SpO2 99%   BMI 26.15 kg/m²   Estimated body mass index is 26.15 kg/m² as calculated from the following:    Height as of this encounter: 160 cm (62.99\").    Weight as of this encounter: 67 kg (147 lb 9.6 oz).           Physical Exam  Musculoskeletal:        Feet:    Feet:      Comments: TTP on medial aspect of L big toe. No surrounding erythema, swelling, or warmth. Slight embedding of medial spicule of L big toenail in the nail fold.              Procedures     Results:  No results found for this or any previous visit (from the past 24 hours).     Assessment and Plan "     Assessment/Plan:  Assessment & Plan  Ingrown nail of great toe  Given mild presentation and chronicity of ingrown nail, will opt for conservative management. Educated patient on predisposing factors to ingrown nails such as cutting too short, cutting in curved pattern, and wearing tight shoes. Advised to soak in warm, soapy water for 20mins BID. Advised patient to use dental floss daily to gently separate nail from nail fold as it grows out.            Follow Up  No follow-ups on file.        Heather Gonzalez PA-C   [facility]

## 2025-03-27 NOTE — PROGRESS NOTES
Subjective   Chief Complaint   Patient presents with    Gynecologic Exam     Annual, pt c/o vaginal itching X 1-2 weeks.  Pt states no discharge and no odor.  Pt states that she has been using monistat.     Ziyad Naranjo is a 70 y.o. year old  menopausal female presenting to be seen for her annual exam.  She is doing well, but is complaining of vaginal itching for the last 1-2 weeks. She states there is no discharge or odor. She has tried Monistat 5 or 7 day dose, that seems to be helping. She reports no changes in soaps, laundry detergents, or toilet paper brand. She stopped using the vaginal estrogen cream during this time, but has been using it consistently prior to this.     This past year she has not been on hormone replacement therapy.  She has not had any vaginal bleeding in the last 12 months.  Menopausal symptoms are not present.    Mammogram due in   Colonoscopy due    DEXA: declines and is not interested in ever completing. Declines calcium and vitamin D supplements.     SEXUAL Hx:  She is currently sexually active.  In the past year there there has been NO new sexual partners.    Condoms are never used.  She would not like to be screened for STD's at today's exam.  [place] is painful: no  HEALTH Hx:  She exercises regularly: no (but is planning to start exercising more).  She wears her seat belt: yes.  She has concerns about domestic violence: no.    The following portions of the patient's history were reviewed and updated as appropriate:problem list, current medications, allergies, past family history, past medical history, past social history, and past surgical history.    Social History    Tobacco Use      Smoking status: Former        Packs/day: 0.00        Years: 0.7 packs/day for 14.0 years (10.3 ttl pk-yrs)        Types: Cigarettes        Start date:         Quit date: 2013        Years since quittin.2        Passive exposure: Never      Smokeless tobacco: Never        "  Objective   /80 (BP Location: Left arm, Patient Position: Sitting, Cuff Size: Adult)   Ht 160 cm (62.99\")   Wt 66.5 kg (146 lb 9.6 oz)   LMP  (LMP Unknown)   Breastfeeding No   BMI 25.98 kg/m²     General:  well developed; well nourished  no acute distress  mentation appropriate   Breasts:  Examined in supine position  Symmetric without masses or skin dimpling  Nipples normal without inversion, lesions or discharge  There are no palpable axillary nodes   Pelvis: Clinical staff was present for exam  External genitalia:  normal appearance of the external genitalia including Bartholin's and Benbrook's glands.  :  urethral meatus normal; urethra normal:  Vaginal:  atrophic mucosal changes are present;  Cervix:  normal appearance.  Uterus:  normal size, shape and consistency.  Adnexa:  normal bimanual exam of the adnexa.  Rectal:  digital rectal exam not performed; anus visually normal appearing.        Assessment   Annual GYN exam  Vaginal itching   Vaginal atrophy   Menopausal female currently not on HRT - without significant symptoms affecting activities of daily living  She is up to date on all relevant gynecologic and colorectal screenings     Plan   Pap was not done today.  I explained to Ziyad that the Pap smears are no longer recommended in patient's after 65 years of age.   I stressed to Ziyad that she still should be seen to be seen yearly for a full physical including breast and pelvic exam.   She was encouraged to get yearly mammograms, order placed today.  She should report any palpable breast lump(s) or skin changes regardless of mammographic findings.  I explained to Ziyad that notification regarding her mammogram results will come from the center performing the study.  Our office will not be routinely calling with mammogram results.  It is her responsibility to make sure that the results from the mammogram are communicated to her by the breast center.  If she has any questions " about the results, she is welcome to call our office anytime.  Leukorrhea swab collected.  Will empirically treat for yeast infection with 2 doses of Diflucan, and will adjust treatment based on swab results, if indicated.  Continue vaginal estrogen cream. Refill sent to compounding pharmacy.   The importance of keeping all planned follow-up and taking all medications as prescribed was emphasized.  Follow up for annual exam in 1 year or sooner PRN      New Medications Ordered This Visit   Medications    estradiol (ESTRACE VAGINAL) 0.1 MG/GM vaginal cream     Sig: Insert 0.5g vaginally 2x weekly at night     Dispense:  42.5 g     Refill:  2    fluconazole (Diflucan) 200 MG tablet     Sig: Take 1 tablet by mouth Daily for 2 days.     Dispense:  2 tablet     Refill:  0          This note was electronically signed.    Rica Werner MD  March 28, 2025

## 2025-03-28 ENCOUNTER — OFFICE VISIT (OUTPATIENT)
Dept: OBSTETRICS AND GYNECOLOGY | Facility: CLINIC | Age: 70
End: 2025-03-28
Payer: MEDICARE

## 2025-03-28 VITALS
HEIGHT: 63 IN | SYSTOLIC BLOOD PRESSURE: 130 MMHG | WEIGHT: 146.6 LBS | DIASTOLIC BLOOD PRESSURE: 80 MMHG | BODY MASS INDEX: 25.98 KG/M2

## 2025-03-28 DIAGNOSIS — N76.0 ACUTE VAGINITIS: ICD-10-CM

## 2025-03-28 DIAGNOSIS — N89.8 VAGINA ITCHING: ICD-10-CM

## 2025-03-28 DIAGNOSIS — N95.2 VAGINAL ATROPHY: ICD-10-CM

## 2025-03-28 DIAGNOSIS — Z12.31 ENCOUNTER FOR SCREENING MAMMOGRAM FOR BREAST CANCER: ICD-10-CM

## 2025-03-28 DIAGNOSIS — Z01.419 WELL WOMAN EXAM: Primary | ICD-10-CM

## 2025-03-28 RX ORDER — ESTRADIOL 0.1 MG/G
CREAM VAGINAL
Qty: 42.5 G | Refills: 2 | Status: SHIPPED | OUTPATIENT
Start: 2025-03-28

## 2025-03-28 RX ORDER — FLUCONAZOLE 200 MG/1
200 TABLET ORAL DAILY
Qty: 2 TABLET | Refills: 0 | Status: SHIPPED | OUTPATIENT
Start: 2025-03-28 | End: 2025-03-30

## 2025-04-01 ENCOUNTER — TELEPHONE (OUTPATIENT)
Dept: OBSTETRICS AND GYNECOLOGY | Facility: CLINIC | Age: 70
End: 2025-04-01
Payer: MEDICARE

## 2025-04-01 RX ORDER — ESTRADIOL 0.1 MG/G
CREAM VAGINAL
Qty: 42.5 G | Refills: 2 | Status: SHIPPED | OUTPATIENT
Start: 2025-04-01

## 2025-04-01 NOTE — TELEPHONE ENCOUNTER
Patient states that she made an error and her prescription of estradiol needs to go to [facility] and not [facility] as she had requested.

## 2025-04-09 ENCOUNTER — TELEPHONE (OUTPATIENT)
Dept: OBSTETRICS AND GYNECOLOGY | Facility: CLINIC | Age: 70
End: 2025-04-09
Payer: MEDICARE

## 2025-04-09 RX ORDER — TRIAMCINOLONE ACETONIDE 5 MG/G
1 CREAM TOPICAL DAILY PRN
Qty: 15 G | Refills: 0 | Status: SHIPPED | OUTPATIENT
Start: 2025-04-09

## 2025-04-09 NOTE — TELEPHONE ENCOUNTER
At this point, it will take time for the probiotic and the estrogen cream to work to relieve her symptoms (up to 12 weeks). I can do a small amount of steroid cream to help with the itching, but I do not recommend using it for a prolonged period of time. Kenalog cream sent to her listed pharmacy.     Rica Werner MD

## 2025-04-09 NOTE — TELEPHONE ENCOUNTER
Pt calling was in not long ago with a yeast infection and Dr Werner gave her meds and she is still a little itchy wants to know what plan b is please call

## 2025-07-30 LAB
NCCN CRITERIA FLAG: NORMAL
TYRER CUZICK SCORE: 4.8

## 2025-07-31 ENCOUNTER — HOSPITAL ENCOUNTER (OUTPATIENT)
Dept: MAMMOGRAPHY | Facility: HOSPITAL | Age: 70
Discharge: HOME | End: 2025-07-31
Admitting: STUDENT IN AN ORGANIZED HEALTH CARE EDUCATION/TRAINING PROGRAM
Payer: MEDICARE

## 2025-07-31 DIAGNOSIS — Z12.31 ENCOUNTER FOR SCREENING MAMMOGRAM FOR BREAST CANCER: ICD-10-CM

## 2025-07-31 PROCEDURE — 77067 SCR MAMMO BI INCL CAD: CPT

## 2025-07-31 PROCEDURE — 77063 BREAST TOMOSYNTHESIS BI: CPT
